# Patient Record
Sex: MALE | Race: WHITE | NOT HISPANIC OR LATINO | Employment: FULL TIME | RURAL
[De-identification: names, ages, dates, MRNs, and addresses within clinical notes are randomized per-mention and may not be internally consistent; named-entity substitution may affect disease eponyms.]

---

## 2024-07-11 ENCOUNTER — OFFICE VISIT (OUTPATIENT)
Dept: FAMILY MEDICINE CLINIC | Facility: CLINIC | Age: 29
End: 2024-07-11
Payer: COMMERCIAL

## 2024-07-11 VITALS
HEART RATE: 66 BPM | DIASTOLIC BLOOD PRESSURE: 70 MMHG | OXYGEN SATURATION: 99 % | HEIGHT: 64 IN | SYSTOLIC BLOOD PRESSURE: 110 MMHG | WEIGHT: 139.1 LBS | BODY MASS INDEX: 23.75 KG/M2

## 2024-07-11 DIAGNOSIS — N52.9 ERECTILE DYSFUNCTION, UNSPECIFIED ERECTILE DYSFUNCTION TYPE: ICD-10-CM

## 2024-07-11 DIAGNOSIS — Z00.00 WELL ADULT EXAM: Primary | ICD-10-CM

## 2024-07-11 PROCEDURE — 99213 OFFICE O/P EST LOW 20 MIN: CPT | Performed by: STUDENT IN AN ORGANIZED HEALTH CARE EDUCATION/TRAINING PROGRAM

## 2024-07-11 PROCEDURE — 99385 PREV VISIT NEW AGE 18-39: CPT | Performed by: STUDENT IN AN ORGANIZED HEALTH CARE EDUCATION/TRAINING PROGRAM

## 2024-07-11 NOTE — PROGRESS NOTES
"Chief Complaint  Establish Care (Needed to est care with a pcp, main concern is erectile dysfunction he has tried 5mg Viagra from offline and did not help. He could get an erection but could not keep it enough to finish. He also discontinued it because of side effects, headaches and made him feel uneasy)    Subjective          Bill Valadez presents to [facility]  History of Present Illness    Patient is here to establish care.     Patient states that he is overall doing well at this time.  Patient states that he has no past medical history that he is aware of,, and had a surgery that was in the remote past.  He denies any family history of problems that he is aware of at this time.  He is not currently taking any medication other than 5 mg Cialis that he got offline.    Patient states that he got to the Cialis to help with erectile dysfunction.  He states that this has been present for the past year or so, and has become very bothersome.  Patient states that he is unable to fully obtain or maintain an erection.  Patient states that he has very low sex drive and this is causing him to have difficulties in his relationships.  Patient has no family history of prostate or colon cancer that he is aware of.  He has not had this evaluated in the past.        Objective   Vital Signs:   /70   Pulse 66   Ht 162.6 cm (64\")   Wt 63.1 kg (139 lb 1.6 oz)   SpO2 99%   BMI 23.88 kg/m²     Body mass index is 23.88 kg/m².    Review of Systems    Past History:  Medical History: has no past medical history on file.   Surgical History: has a past surgical history that includes Testiclar cyst excision (Left, 01/01/2005).   Family History: family history is not on file.   Social History: reports that he has never smoked. He has never used smokeless tobacco. He reports that he does not currently use alcohol. He reports that he does not use drugs.    No current outpatient medications on " file.    Allergies: Patient has no known allergies.    Physical Exam  Constitutional:       General: He is not in acute distress.     Appearance: He is not ill-appearing or toxic-appearing.   HENT:      Head: Normocephalic and atraumatic.   Cardiovascular:      Rate and Rhythm: Normal rate and regular rhythm.      Heart sounds: No murmur heard.  Pulmonary:      Effort: Pulmonary effort is normal. No respiratory distress.   Abdominal:      Tenderness: There is no abdominal tenderness. There is no guarding or rebound.   Musculoskeletal:      Right lower leg: No edema.      Left lower leg: No edema.   Neurological:      General: No focal deficit present.      Mental Status: He is alert and oriented to person, place, and time.   Psychiatric:         Mood and Affect: Mood normal.         Thought Content: Thought content normal.          Result Review :                   Assessment and Plan    Diagnoses and all orders for this visit:    1. Well adult exam (Primary)  -     Comprehensive Metabolic Panel; Future  -     CBC & Differential; Future  -     Lipid Panel; Future  -     TSH; Future  -     T4, Free; Future    2. Erectile dysfunction, unspecified erectile dysfunction type  -     TSH; Future  -     T4, Free; Future  -     Testosterone; Future    Blood work ordered and will contact with results when available. Will adjust medications based on abnormalities seen.     Discussed with patient that currently he can take 2 of the 5 mg Cialis that he has currently and see if that makes a difference in his ED symptoms, however if there is not a metabolic cause behind his ED recommend that he be evaluated by urology for further evaluation and management.  Patient is agreeable to this.    Past medical and surgical history as well as allergies, family history and social history were reviewed, and discussed with patient.  Chronic conditions were reviewed as well as medications.   Anticipatory guidance handouts including healthy  diet, health maintenance, as well as regular exercise and general instructions were given via [employer], and patient was able to ask questions and discuss any concerns.        Follow Up   No follow-ups on file.  Patient was given instructions and counseling regarding his condition or for health maintenance advice. Please see specific information pulled into the AVS if appropriate.     Rosemarie Burks, DO

## 2024-07-16 ENCOUNTER — LAB (OUTPATIENT)
Dept: FAMILY MEDICINE CLINIC | Facility: CLINIC | Age: 29
End: 2024-07-16
Payer: COMMERCIAL